# Patient Record
Sex: FEMALE | Race: WHITE | NOT HISPANIC OR LATINO | URBAN - METROPOLITAN AREA
[De-identification: names, ages, dates, MRNs, and addresses within clinical notes are randomized per-mention and may not be internally consistent; named-entity substitution may affect disease eponyms.]

---

## 2024-10-02 ENCOUNTER — OFFICE VISIT (OUTPATIENT)
Dept: URBAN - METROPOLITAN AREA CLINIC 19 | Facility: CLINIC | Age: 74
End: 2024-10-02
Payer: MEDICARE

## 2024-10-02 ENCOUNTER — LAB OUTSIDE AN ENCOUNTER (OUTPATIENT)
Dept: URBAN - METROPOLITAN AREA CLINIC 19 | Facility: CLINIC | Age: 74
End: 2024-10-02

## 2024-10-02 ENCOUNTER — DASHBOARD ENCOUNTERS (OUTPATIENT)
Age: 74
End: 2024-10-02

## 2024-10-02 VITALS
SYSTOLIC BLOOD PRESSURE: 128 MMHG | BODY MASS INDEX: 22.56 KG/M2 | WEIGHT: 122.6 LBS | DIASTOLIC BLOOD PRESSURE: 62 MMHG | HEART RATE: 65 BPM | HEIGHT: 62 IN | TEMPERATURE: 98.2 F

## 2024-10-02 DIAGNOSIS — Z95.5 HISTORY OF CORONARY ARTERY STENT PLACEMENT: ICD-10-CM

## 2024-10-02 DIAGNOSIS — Z86.0100 HISTORY OF COLON POLYPS: ICD-10-CM

## 2024-10-02 DIAGNOSIS — R14.0 BLOATING: ICD-10-CM

## 2024-10-02 DIAGNOSIS — R13.12 OROPHARYNGEAL DYSPHAGIA: ICD-10-CM

## 2024-10-02 DIAGNOSIS — I25.10 CORONARY ARTERY DISEASE INVOLVING NATIVE CORONARY ARTERY OF NATIVE HEART WITHOUT ANGINA PECTORIS: ICD-10-CM

## 2024-10-02 DIAGNOSIS — G47.33 OSA ON CPAP: ICD-10-CM

## 2024-10-02 DIAGNOSIS — K21.00 CHRONIC REFLUX ESOPHAGITIS: ICD-10-CM

## 2024-10-02 DIAGNOSIS — Z85.818 HISTORY OF CANCER TONSIL: ICD-10-CM

## 2024-10-02 PROBLEM — 78275009: Status: ACTIVE | Noted: 2024-10-02

## 2024-10-02 PROBLEM — 53741008: Status: ACTIVE | Noted: 2024-10-02

## 2024-10-02 PROCEDURE — 99204 OFFICE O/P NEW MOD 45 MIN: CPT | Performed by: INTERNAL MEDICINE

## 2024-10-02 RX ORDER — DOCUSATE SODIUM 100 MG/1
TAKE 1 CAPSULE (100 MG) BY ORAL ROUTE ONCE DAILY CAPSULE ORAL 1
Qty: 0 | Refills: 0 | Status: ACTIVE | COMMUNITY
Start: 1900-01-01 | End: 1900-01-01

## 2024-10-02 RX ORDER — SUCRALFATE 1 G/1
TABLET ORAL
Qty: 0 | Refills: 0 | Status: ACTIVE | COMMUNITY
Start: 1900-01-01 | End: 1900-01-01

## 2024-10-02 RX ORDER — SIMVASTATIN 20 MG/1
TABLET, FILM COATED ORAL
Qty: 0 | Refills: 0 | Status: ACTIVE | COMMUNITY
Start: 1900-01-01 | End: 1900-01-01

## 2024-10-02 RX ORDER — OMEPRAZOLE 20 MG/1
TAKE ONE CAPSULE BY MOUTH TWICE A DAY CAPSULE, DELAYED RELEASE ORAL
Qty: 180 | Refills: 3 | Status: ACTIVE | COMMUNITY
Start: 2019-02-25 | End: 1900-01-01

## 2024-10-02 RX ORDER — CLOPIDOGREL BISULFATE 75 MG
TAKE 1 TABLET (75 MG) BY ORAL ROUTE ONCE DAILY TABLET ORAL 1
Qty: 0 | Refills: 0 | Status: ACTIVE | COMMUNITY
Start: 1900-01-01 | End: 1900-01-01

## 2024-10-02 RX ORDER — OLMESARTAN MEDOXOMIL 20 MG/1
TAKE 1 TABLET (20 MG) BY ORAL ROUTE ONCE DAILY TABLET, FILM COATED ORAL 1
Qty: 0 | Refills: 0 | Status: ACTIVE | COMMUNITY
Start: 1900-01-01 | End: 1900-01-01

## 2024-10-02 RX ORDER — SIMETHICONE 80 MG/1
CHEW 1-2 TABLETS BY ORAL ROUTE ONCE A DAY (AT BEDTIME) FOR 30 DAYS TABLET, CHEWABLE ORAL 1
Qty: 60 | Refills: 2 | Status: ACTIVE | COMMUNITY
Start: 2017-08-08 | End: 1900-01-01

## 2024-10-02 RX ORDER — HYDRALAZINE HYDROCHLORIDE 25 MG/1
TABLET ORAL
Qty: 0 | Refills: 0 | Status: ACTIVE | COMMUNITY
Start: 1900-01-01 | End: 1900-01-01

## 2024-10-02 RX ORDER — LEVOTHYROXINE SODIUM 50 UG/1
TABLET ORAL
Qty: 0 | Refills: 0 | Status: ACTIVE | COMMUNITY
Start: 1900-01-01 | End: 1900-01-01

## 2024-10-02 NOTE — HPI-TODAY'S VISIT:
10/2/24: Patient presents as a referral from Dr. Noy Fuchs for a surveillance colonoscopy. A copy of this note will be sent to the referring provider.  I performed her last EGD/colonoscopy on 12/9/24 and found evidence of mild gastritis/reflux esophagitis and two adenomatous polyps (removed). Her main complaint today is bloating - she was recently started on CPAP for her ATA, but she admits that she does not use it often. She has some issues with her nasal passages - she sees Dr. Anirudh Rangel for that. She has a history of tonsillar cancer s/p radiation and complains of mild oropharyngeal dysphagia. She has a history of coronary artery disease and has 2 stents in place - currently on ASA. Sees Dr. Jason Frank for her cardiac issues.

## 2024-10-03 ENCOUNTER — OFFICE VISIT (OUTPATIENT)
Dept: URBAN - METROPOLITAN AREA CLINIC 19 | Facility: CLINIC | Age: 74
End: 2024-10-03

## 2024-11-05 ENCOUNTER — TELEPHONE ENCOUNTER (OUTPATIENT)
Dept: URBAN - METROPOLITAN AREA CLINIC 19 | Facility: CLINIC | Age: 74
End: 2024-11-05

## 2024-12-06 ENCOUNTER — TELEPHONE ENCOUNTER (OUTPATIENT)
Dept: URBAN - METROPOLITAN AREA CLINIC 19 | Facility: CLINIC | Age: 74
End: 2024-12-06

## 2025-01-06 ENCOUNTER — TELEPHONE ENCOUNTER (OUTPATIENT)
Dept: URBAN - METROPOLITAN AREA CLINIC 19 | Facility: CLINIC | Age: 75
End: 2025-01-06

## 2025-01-08 ENCOUNTER — TELEPHONE ENCOUNTER (OUTPATIENT)
Dept: URBAN - METROPOLITAN AREA CLINIC 19 | Facility: CLINIC | Age: 75
End: 2025-01-08

## 2025-01-10 ENCOUNTER — OFFICE VISIT (OUTPATIENT)
Dept: URBAN - METROPOLITAN AREA MEDICAL CENTER 25 | Facility: MEDICAL CENTER | Age: 75
End: 2025-01-10